# Patient Record
Sex: FEMALE | Race: BLACK OR AFRICAN AMERICAN | NOT HISPANIC OR LATINO | URBAN - METROPOLITAN AREA
[De-identification: names, ages, dates, MRNs, and addresses within clinical notes are randomized per-mention and may not be internally consistent; named-entity substitution may affect disease eponyms.]

---

## 2023-04-24 ENCOUNTER — APPOINTMENT (RX ONLY)
Dept: URBAN - METROPOLITAN AREA OTHER 5 | Facility: OTHER | Age: 43
Setting detail: DERMATOLOGY
End: 2023-04-24

## 2023-04-24 DIAGNOSIS — L66.8 OTHER CICATRICIAL ALOPECIA: ICD-10-CM

## 2023-04-24 DIAGNOSIS — L65.8 OTHER SPECIFIED NONSCARRING HAIR LOSS: ICD-10-CM

## 2023-04-24 DIAGNOSIS — L66.81 CENTRAL CENTRIFUGAL CICATRICIAL ALOPECIA: ICD-10-CM

## 2023-04-24 PROCEDURE — 99204 OFFICE O/P NEW MOD 45 MIN: CPT

## 2023-04-24 PROCEDURE — ? COUNSELING

## 2023-04-24 PROCEDURE — ? PRESCRIPTION

## 2023-04-24 PROCEDURE — ? PRESCRIPTION MEDICATION MANAGEMENT

## 2023-04-24 PROCEDURE — ? PATIENT SPECIFIC COUNSELING

## 2023-04-24 PROCEDURE — ? TREATMENT REGIMEN

## 2023-04-24 RX ORDER — DOXYCYCLINE HYCLATE 100 MG/1
CAPSULE, GELATIN COATED ORAL
Qty: 28 | Refills: 0 | Status: ERX | COMMUNITY
Start: 2023-04-24

## 2023-04-24 RX ORDER — CLOBETASOL PROPIONATE 0.5 MG/G
OINTMENT TOPICAL QD
Qty: 60 | Refills: 2 | Status: ERX | COMMUNITY
Start: 2023-04-24

## 2023-04-24 RX ADMIN — DOXYCYCLINE HYCLATE: 100 CAPSULE, GELATIN COATED ORAL at 00:00

## 2023-04-24 RX ADMIN — CLOBETASOL PROPIONATE: 0.5 OINTMENT TOPICAL at 00:00

## 2023-04-24 ASSESSMENT — LOCATION DETAILED DESCRIPTION DERM
LOCATION DETAILED: SUPERIOR MID FOREHEAD
LOCATION DETAILED: LEFT SUPERIOR PARIETAL SCALP

## 2023-04-24 ASSESSMENT — LOCATION ZONE DERM
LOCATION ZONE: FACE
LOCATION ZONE: SCALP

## 2023-04-24 ASSESSMENT — LOCATION SIMPLE DESCRIPTION DERM
LOCATION SIMPLE: SUPERIOR FOREHEAD
LOCATION SIMPLE: SCALP

## 2023-04-24 NOTE — PROCEDURE: PATIENT SPECIFIC COUNSELING
Detail Level: Zone
Discussed genetic component of hair loss. Patient mentioned that she has fibroids.
Discussed importance of avoiding iris and leaving hair in an unbraided style. Suggested finding a natural .\\Rochelle discussed hair transplantation, PRP and suggested making a consultation with Dr. Feng if interested in either of these interventions.

## 2023-04-24 NOTE — HPI: HAIR LOSS
How Did The Hair Loss Occur?: gradual in onset
How Severe Is Your Hair Loss?: moderate
Additional History: Biopsy performed by Dr. Ella Arroyo in November 2022 at the [facility] of the left parietal scalp demonstrated chronic traction alopecia and early signs of scarring alopecia, such as CCCA. Patient used to wear braided extensions, wigs, currently iris her own hair and leaves iris in for 2-3 days at a time. Per patient states that she was told that there was nothing that could be done. Currently breastfeeding her 1.5 yr old child.

## 2023-04-24 NOTE — PROCEDURE: PRESCRIPTION MEDICATION MANAGEMENT
Detail Level: Zone
Initiate Treatment: doxycycline hyclate 100 mg capsule \\nSig: Take one capsule PO twice daily with plenty of food and water. Do not lay down for 1hr after taking.\\n\\nclobetasol 0.05 % topical ointment QD\\nSig: Apply to affected areas of scalp twice a day. Wash hands thoroughly after using.
Plan: Patient is breastfeeding, but wanted to start doxycycline. Dr. Acevedo told her that doxycycline cannot be used long term while breastfeeding, so she prescribed patient doxycycline for 2 weeks.
Render In Strict Bullet Format?: No

## 2025-07-02 ENCOUNTER — OFFICE VISIT (OUTPATIENT)
Dept: URBAN - METROPOLITAN AREA TELEHEALTH 2 | Facility: TELEHEALTH | Age: 45
End: 2025-07-02
Payer: COMMERCIAL

## 2025-07-02 DIAGNOSIS — Z01.818 ENCOUNTER FOR DIAGNOSTIC ENDOSCOPY: ICD-10-CM

## 2025-07-02 DIAGNOSIS — E66.01 MORBID OBESITY (BMI 40 AND OVER): ICD-10-CM

## 2025-07-02 PROCEDURE — 97802 MEDICAL NUTRITION INDIV IN: CPT | Performed by: DIETITIAN, REGISTERED

## 2025-07-02 NOTE — HPI-TODAY'S VISIT:
Nithya Le, a 44-year-old female, presented for evaluation regarding weight management and consideration of a bariatric procedure. She described a longstanding struggle with obesity, noting her weight has fluctuated between 268 and 280 pounds, with her lowest weight in the past five years being 248 pounds. She reported that her current dietary habits lack adherence, acknowledging that she often defaults to convenience foods such as fast food, especially when busy or stressed. 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 Her 's irregular work schedule further complicates meal planning and consistency. Nithya expressed that stress and a hectic lifestyle have made it difficult to prioritize meal planning or stick to dietary changes, leading to inconsistent efforts and frequent lapses into old habits. She was referred by [facility] for evaluation and is actively considering bariatric surgery as a potential intervention for her weight management challenges.

## 2025-07-25 ENCOUNTER — OFFICE VISIT (OUTPATIENT)
Dept: URBAN - METROPOLITAN AREA TELEHEALTH 2 | Facility: TELEHEALTH | Age: 45
End: 2025-07-25
Payer: COMMERCIAL

## 2025-07-25 DIAGNOSIS — E66.01 MORBID OBESITY: ICD-10-CM

## 2025-07-25 PROCEDURE — 97803 MED NUTRITION INDIV SUBSEQ: CPT | Performed by: DIETITIAN, REGISTERED

## 2025-07-25 NOTE — HPI-TODAY'S VISIT:
Nithya Le, a 44-year-old female, presented for a chronic condition follow-up focused on bariatric surgery nutrition clearance and dietary counseling. She expressed uncertainty about the number of nutrition visits required for insurance approval and had questions regarding the timeline between surgical clearance and the actual procedure. Despite previous discussions about dietary changes, she admitted she has not yet implemented these recommendations, citing significant burnout from years of working multiple jobs since having her daughter. As the primary breadwinner, she recently quit her job due to exhaustion and is currently not working until her daughter begins  in two weeks, which she hopes will relieve some financial and personal stress. She described her usual reliance on food delivery services like [employer], noting that cooking at home is rare for her. However, she recently managed to cook at home two days in a row, which she considered a significant achievement. To improve her eating habits, she has started strategies such as diluting takeout meals with vegetables and lean protein, and when ordering pizza for her daughter, she ensures to include a salad and prioritizes eating the salad and chicken before the pizza. She also discussed her concerns about the upcoming liquid diet phase required for surgery, wondering about the best time to start practicing and how to avoid burnout from repetitive protein shakes. Her history includes being intubated during pregnancy, and she continues to feel overwhelmed by the demands of balancing work, family, and self-care.

## 2025-08-15 ENCOUNTER — OFFICE VISIT (OUTPATIENT)
Dept: URBAN - METROPOLITAN AREA TELEHEALTH 2 | Facility: TELEHEALTH | Age: 45
End: 2025-08-15
Payer: COMMERCIAL

## 2025-08-15 DIAGNOSIS — E66.813 CLASS 3 OBESITY: ICD-10-CM

## 2025-08-15 PROCEDURE — 97803 MED NUTRITION INDIV SUBSEQ: CPT | Performed by: DIETITIAN, REGISTERED

## 2025-08-24 ENCOUNTER — DASHBOARD ENCOUNTERS (OUTPATIENT)
Age: 45
End: 2025-08-24